# Patient Record
Sex: MALE | Race: WHITE
[De-identification: names, ages, dates, MRNs, and addresses within clinical notes are randomized per-mention and may not be internally consistent; named-entity substitution may affect disease eponyms.]

---

## 2017-11-08 ENCOUNTER — HOSPITAL (OUTPATIENT)
Dept: OTHER | Age: 22
End: 2017-11-08
Attending: EMERGENCY MEDICINE

## 2020-09-09 ENCOUNTER — HOSPITAL (OUTPATIENT)
Dept: OTHER | Age: 25
End: 2020-09-09
Attending: FAMILY MEDICINE

## 2020-09-09 ENCOUNTER — HOSPITAL (OUTPATIENT)
Dept: OTHER | Age: 25
End: 2020-09-09
Attending: NURSE PRACTITIONER

## 2020-09-09 LAB
ABS LYMPH: 1.4 K/CUMM (ref 1–3.5)
ABS LYMPH: 1.4 K/CUMM (ref 1–3.5)
ABS MONO: 1.2 K/CUMM (ref 0.1–0.8)
ABS MONO: 1.2 K/CUMM (ref 0.1–0.8)
ABS NEUTRO: 12.2 K/CUMM (ref 2–8)
ABS NEUTRO: 12.2 K/CUMM (ref 2–8)
ANION GAP SERPL CALC-SCNC: 14 MEQ/L (ref 10–20)
ANION GAP SERPL CALC-SCNC: 14 MEQ/L (ref 10–20)
BASOPHIL: 0 % (ref 0–1)
BASOPHIL: 0 % (ref 0–1)
BUN SERPL-MCNC: 9 MG/DL (ref 6–20)
BUN SERPL-MCNC: 9 MG/DL (ref 6–20)
CALCIUM: 9.4 MG/DL (ref 8.4–10.2)
CALCIUM: 9.4 MG/DL (ref 8.4–10.2)
CHLORIDE: 105 MEQ/L (ref 97–107)
CHLORIDE: 105 MEQ/L (ref 97–107)
CREATININE: 1 MG/DL (ref 0.6–1.3)
CREATININE: 1 MG/DL (ref 0.6–1.3)
DIFF_TYPE?: ABNORMAL
EOSINOPHIL: 0 % (ref 0–6)
EOSINOPHIL: 0 % (ref 0–6)
GLUCOSE LVL: 108 MG/DL (ref 70–99)
GLUCOSE LVL: 108 MG/DL (ref 70–99)
HCT VFR BLD CALC: 39 % (ref 36–51)
HCT VFR BLD CALC: 39 % (ref 36–51)
HEMOLYSIS 2+: NEGATIVE
HGB BLD-MCNC: 12.8 G/DL (ref 12–17)
HGB BLD-MCNC: 12.8 G/DL (ref 12–17)
IMMATURE GRAN: 0.3 % (ref 0–0.3)
IMMATURE GRAN: 0.3 % (ref 0–0.3)
INSTR WBC: 14.9 K/CUMM (ref 4–11)
LYMPHOCYTE: 9 %
LYMPHOCYTE: 9 %
MCH RBC QN AUTO: 28 PG (ref 25–35)
MCH RBC QN AUTO: 28 PG (ref 25–35)
MCHC RBC AUTO-ENTMCNC: 32 G/DL (ref 32–37)
MCHC RBC AUTO-ENTMCNC: 32 G/DL (ref 32–37)
MCV RBC AUTO: 86 FL (ref 78–97)
MCV RBC AUTO: 86 FL (ref 78–97)
MONOCYTE: 8 %
MONOCYTE: 8 %
NEUTROPHIL: 82 %
NEUTROPHIL: 82 %
NRBC BLD MANUAL-RTO: 0 % (ref 0–0.2)
NRBC BLD MANUAL-RTO: 0 % (ref 0–0.2)
PLATELET: 227 K/CUMM (ref 150–450)
PLATELET: 227 K/CUMM (ref 150–450)
PLT ESTIMATE: ADEQUATE
PLT ESTIMATE: ADEQUATE
POTASSIUM: 3.6 MEQ/L (ref 3.5–5.1)
POTASSIUM: 3.6 MEQ/L (ref 3.5–5.1)
RBC # BLD: 4.57 M/CUMM (ref 4.2–6)
RBC # BLD: 4.57 M/CUMM (ref 4.2–6)
RBC MORPH: NORMAL
RBC MORPH: NORMAL
RDW: 13.3 % (ref 11.5–14.5)
RDW: 13.3 % (ref 11.5–14.5)
SODIUM: 138 MEQ/L (ref 136–145)
SODIUM: 138 MEQ/L (ref 136–145)
TCO2: 23 MEQ/L (ref 19–29)
TCO2: 23 MEQ/L (ref 19–29)
WBC # BLD: 14.9 K/CUMM (ref 4–11)
WBC # BLD: 14.9 K/CUMM (ref 4–11)

## 2025-03-29 ENCOUNTER — HOSPITAL ENCOUNTER (OUTPATIENT)
Age: 30
Discharge: HOME | End: 2025-03-29
Payer: MEDICAID

## 2025-03-29 VITALS
HEART RATE: 62 BPM | TEMPERATURE: 98 F | OXYGEN SATURATION: 98 % | DIASTOLIC BLOOD PRESSURE: 82 MMHG | SYSTOLIC BLOOD PRESSURE: 137 MMHG | RESPIRATION RATE: 16 BRPM

## 2025-03-29 DIAGNOSIS — A08.4 VIRAL GASTROENTERITIS: Primary | ICD-10-CM

## 2025-03-29 LAB
BILIRUB UR QL STRIP: NEGATIVE
CLARITY UR: CLEAR
COLOR UR: YELLOW
GLUCOSE UR STRIP-MCNC: NEGATIVE MG/DL
HGB UR QL STRIP: NEGATIVE
KETONES UR STRIP-MCNC: NEGATIVE MG/DL
LEUKOCYTE ESTERASE UR QL STRIP: NEGATIVE
NITRITE UR QL STRIP: NEGATIVE
PH UR STRIP: 7 [PH]
PROT UR STRIP-MCNC: NEGATIVE MG/DL
SP GR UR STRIP: 1.02
UROBILINOGEN UR STRIP-ACNC: <2 MG/DL

## 2025-03-29 PROCEDURE — 81002 URINALYSIS NONAUTO W/O SCOPE: CPT | Performed by: PHYSICIAN ASSISTANT

## 2025-03-29 PROCEDURE — 99203 OFFICE O/P NEW LOW 30 MIN: CPT | Performed by: PHYSICIAN ASSISTANT

## 2025-03-29 RX ORDER — ONDANSETRON 4 MG/1
4 TABLET, ORALLY DISINTEGRATING ORAL EVERY 4 HOURS PRN
Qty: 10 TABLET | Refills: 0 | Status: SHIPPED | OUTPATIENT
Start: 2025-03-29 | End: 2025-04-05

## 2025-03-29 RX ORDER — QUETIAPINE FUMARATE 200 MG/1
200 TABLET, FILM COATED ORAL NIGHTLY
COMMUNITY
Start: 2025-02-12

## 2025-03-29 NOTE — ED PROVIDER NOTES
Patient Seen in: [facility]      History     Chief Complaint   Patient presents with    Nausea/Vomiting/Diarrhea     Stated Complaint: Vomiting    Subjective:   The history is provided by the patient.         30-year-old male with no significant past medical history presents to the immediate care due to nausea vomiting diarrhea starting yesterday.  Patient had multiple bouts of nonbloody emesis; none today.  Tolerating p.o. intake today but continues to feel nauseous.  Multiple bouts of nonbloody nontarry diarrhea yesterday as well but once again none today.  Abdominal pain yesterday prior to vomiting however this also has dissipated. believes It was cramping from the vomit denies any fevers URI type symptoms.  His wife had similar symptoms last week and diagnosed with norovirus.  No medications attempted at home.  No previous abdominal surgeries    Objective:     History reviewed. No pertinent past medical history.           History reviewed. No pertinent surgical history.             No pertinent social history.            Review of Systems   Constitutional: Negative.    HENT: Negative.     Respiratory: Negative.     Cardiovascular: Negative.    Gastrointestinal:  Positive for diarrhea, nausea and vomiting. Negative for abdominal pain.   Genitourinary: Negative.        Positive for stated complaint: Vomiting  Other systems are as noted in HPI.  Constitutional and vital signs reviewed.      All other systems reviewed and negative except as noted above.    Physical Exam     ED Triage Vitals [03/29/25 1519]   /82   Pulse 62   Resp 16   Temp 98.1 °F (36.7 °C)   Temp src Oral   SpO2 98 %   O2 Device None (Room air)       Current Vitals:   Vital Signs  BP: 137/82  Pulse: 62  Resp: 16  Temp: 98.1 °F (36.7 °C)  Temp src: Oral    Oxygen Therapy  SpO2: 98 %  O2 Device: None (Room air)        Physical Exam  Vitals and nursing note reviewed.   Constitutional:       General: He is not in acute distress.      Appearance: He is well-developed. He is not toxic-appearing.   HENT:      Head: Normocephalic.   Cardiovascular:      Rate and Rhythm: Normal rate and regular rhythm.   Pulmonary:      Effort: Pulmonary effort is normal.      Breath sounds: Normal breath sounds.   Abdominal:      General: Abdomen is flat. Bowel sounds are normal.      Palpations: Abdomen is soft.      Tenderness: There is no abdominal tenderness.   Skin:     General: Skin is warm.   Neurological:      General: No focal deficit present.      Mental Status: He is alert and oriented to person, place, and time.             ED Course     Labs Reviewed   [facility] POCT URINALYSIS DIPSTICK                   MDM   Ddx -viral gastroenteritis, pancreatitis, cholecystitis, appendicitis, colitis, diverticulitis    On exam the patient is afebrile nontoxic.  Vital signs are stable.  He is in no acute distress.  Abdomen is soft and nontender.  His normal bowel sounds.  UA shows no signs of infection nor dehydration.  Already tolerating p.o. intake but  continues to feel  nauseous.  Will prescribe Zofran.  Discussed continuance of pushing fluids bland food.  At this time I do not believe advanced imaging is necessary.  Exam is consistent with a viral gastroenteritis especially with his recent sick contact strict ER precautions were discussed all questions were answered and patient comfortable Treatment discharge home      Medical Decision Making  Problems Addressed:  Viral gastroenteritis: acute illness or injury    Amount and/or Complexity of Data Reviewed  Labs: ordered. Decision-making details documented in ED Course.        Disposition and Plan     Clinical Impression:  1. Viral gastroenteritis         Disposition:  Discharge  3/29/2025  3:47 pm    Follow-up:  Shoshana Almodovar, APRN  [address] [address] [phone]                Medications Prescribed:  Discharge Medication List as of 3/29/2025  3:48 PM        START taking these medications     Details   ondansetron 4 MG Oral Tablet Dispersible Take 1 tablet (4 mg total) by mouth every 4 (four) hours as needed for Nausea., Normal, Disp-10 tablet, R-0                 Supplementary Documentation:

## 2025-03-29 NOTE — DISCHARGE INSTRUCTIONS
Increase fluids and rest  Bananas rice applesauce toast  Can use Zofran as needed for nausea or vomiting  Report to the emergency room symptoms worsen abdominal pain fevers

## 2025-03-29 NOTE — ED INITIAL ASSESSMENT (HPI)
Pt sts n/v/d for the past 2 days. Today continues with nausea and feeling weak. Denies cough/cold symptoms.    Initial (On Arrival)

## 2025-04-27 ENCOUNTER — HOSPITAL ENCOUNTER (OUTPATIENT)
Age: 30
Discharge: HOME | End: 2025-04-27
Payer: MEDICAID

## 2025-04-27 ENCOUNTER — APPOINTMENT (OUTPATIENT)
Dept: GENERAL RADIOLOGY | Age: 30
End: 2025-04-27
Attending: PHYSICIAN ASSISTANT
Payer: MEDICAID

## 2025-04-27 VITALS
WEIGHT: 200 LBS | TEMPERATURE: 98 F | RESPIRATION RATE: 20 BRPM | DIASTOLIC BLOOD PRESSURE: 68 MMHG | HEART RATE: 53 BPM | OXYGEN SATURATION: 99 % | SYSTOLIC BLOOD PRESSURE: 167 MMHG

## 2025-04-27 DIAGNOSIS — S49.91XA INJURY OF RIGHT SHOULDER, INITIAL ENCOUNTER: Primary | ICD-10-CM

## 2025-04-27 PROCEDURE — 99213 OFFICE O/P EST LOW 20 MIN: CPT | Performed by: PHYSICIAN ASSISTANT

## 2025-04-27 PROCEDURE — 96372 THER/PROPH/DIAG INJ SC/IM: CPT | Performed by: PHYSICIAN ASSISTANT

## 2025-04-27 PROCEDURE — A4565 SLINGS: HCPCS | Performed by: PHYSICIAN ASSISTANT

## 2025-04-27 PROCEDURE — 73030 X-RAY EXAM OF SHOULDER: CPT | Performed by: PHYSICIAN ASSISTANT

## 2025-04-27 RX ORDER — KETOROLAC TROMETHAMINE 30 MG/ML
30 INJECTION, SOLUTION INTRAMUSCULAR; INTRAVENOUS ONCE
Status: COMPLETED | OUTPATIENT
Start: 2025-04-27 | End: 2025-04-27

## 2025-04-27 RX ORDER — HYDROCODONE BITARTRATE AND ACETAMINOPHEN 5; 325 MG/1; MG/1
1 TABLET ORAL EVERY 6 HOURS PRN
Qty: 10 TABLET | Refills: 0 | Status: SHIPPED | OUTPATIENT
Start: 2025-04-27

## 2025-04-27 NOTE — ED PROVIDER NOTES
Patient Seen in: [facility]      History     Chief Complaint   Patient presents with    Shoulder Pain     Stated Complaint: R Shoulder Injury: Pain    Subjective:   HPI    Patient is a 30-year-old right-hand-dominant male is present emergency room with an injury to his right shoulder.  Patient states that on Friday he was lifting doing bench press and shoulder exercises started noticing pain.  Since then he has had increased pain with the right shoulder.  He points over the anterior aspect and lateral border.  Worsen with movement.  No radiating pain down the arm.  No distal numbness or tingling.  The patient has been taking Tylenol and some ibuprofen.  He took a Norco that was leftover from his girlfriend.  No other reported complaints.  No chest pain or shortness of breath.  He states that it sometimes radiates from the shoulder up to his neck but he has no neck pain  History of Present Illness               Objective:     History reviewed. No pertinent past medical history.         Denies  History reviewed. No pertinent surgical history.         Denies    Social History     Socioeconomic History    Marital status: Single     Social Drivers of Health     Food Insecurity: Not on File (9/26/2024)    Received from [facility]    Food Insecurity     Food: 0   Transportation Needs: Not on File (10/7/2022)    Received from [facility]    Transportation Needs     Transportation: 0    [facility] from [facility]    Housing Stability              Review of Systems   Musculoskeletal:         Right shoulder       Positive for stated complaint: R Shoulder Injury: Pain  Other systems are as noted in HPI.  Constitutional and vital signs reviewed.      All other systems reviewed and negative except as noted above.                  Physical Exam     ED Triage Vitals [04/27/25 0826]   BP (!) 167/68   Pulse 53   Resp 20   Temp 97.8 °F (36.6 °C)   Temp src Oral   SpO2 99 %   O2 Device None (Room air)       Current  Vitals:   Vital Signs  BP: (!) 167/68  Pulse: 53  Resp: 20  Temp: 97.8 °F (36.6 °C)  Temp src: Oral    Oxygen Therapy  SpO2: 99 %  O2 Device: None (Room air)        Physical Exam  Vitals and nursing note reviewed.   Constitutional:       Appearance: Normal appearance. He is normal weight.   HENT:      Head: Normocephalic and atraumatic.   Cardiovascular:      Rate and Rhythm: Normal rate and regular rhythm.      Pulses: Normal pulses.      Heart sounds: Normal heart sounds.   Pulmonary:      Effort: Pulmonary effort is normal.      Breath sounds: Normal breath sounds.   Musculoskeletal:      Cervical back: Normal range of motion and neck supple.      Comments: Examination of the right shoulder girdle fully exposed as well as comparison to the left.  No tenderness to palpation over the clavicle.  There are some mild tenderness over the AC joint of the right shoulder.  There is definite tenderness in the anterior border of the right shoulder along the biceps tendon.  But there is no palpable defect.  There is tenderness along the lateral border of the shoulder girdle itself.  No bony tenderness along the acromion, scapula.  No swelling no redness.  The soft tissue examination of the right upper arm shows that there is no tenderness in the biceps or triceps region.  No humerus, elbow, forearm wrist or hand bony tenderness.  Patient has excellent range of motion of his fingers or wrist on the right side symmetric to the left with the equal symmetric handgrips motor strength 5/5 and symmetric.  Patient flex and extend at the elbow well.  Patient does have some range of motion actively with the right shoulder but it does cause some pain particular when he does extension and abduction.  On passive range of motion.  The patient has better movement but there is still also some pain with flexion, abduction and external rotation of the shoulder.   Skin:     General: Skin is warm and dry.      Capillary Refill: Capillary refill  takes less than 2 seconds.   Neurological:      General: No focal deficit present.      Mental Status: He is alert and oriented to person, place, and time.      Cranial Nerves: No cranial nerve deficit.      Sensory: No sensory deficit.      Motor: No weakness.      Coordination: Coordination normal.           Physical Exam                ED Course   Labs Reviewed - No data to display    ED Course as of 04/27/25 1100  ------------------------------------------------------------  Time: 04/27 0940  Comment: Discussed above findings with patient.  Went over the radiograph studies of the.  Will place patient in sling.  Him follow-up with orthopedic surgery.  Encouraged him not to use his right arm for any lifting, pushing or pulling until fully cleared by physician     Results                 XR SHOULDER, COMPLETE (MIN 2 VIEWS), RIGHT (CPT=73030)  Result Date: 4/27/2025  PROCEDURE:  XR SHOULDER, COMPLETE (MIN 2 VIEWS), RIGHT (CPT=73030)  TECHNIQUE:  Multiple views were obtained.  COMPARISON:  None.  INDICATIONS:  R Shoulder Injury: Pain  PATIENT STATED HISTORY: (As transcribed by Technologist)  Patient with right shoulder pain, limited range of motion for 2 days since lifting weights. Pain anterior then radiates superior and posterior.              CONCLUSION:  No acute fracture or dislocation involving the right shoulder.   LOCATION:  [facility]   Dictated by (CST): Suad Alonzo MD on 4/27/2025 at 9:24 AM     Finalized by (CST): Suad Alonzo MD on 4/27/2025 at 9:24 AM                       MDM      Pertinent Labs & Imaging studies reviewed. (See chart for details)  Differential diagnosis considered but not limited to:   Patient coming in with physician immediate care center for above complaint above findings noted.  Patient will be discharged home.  Close follow-up with orthopedic surgery.  Patient injured his shoulder while working out.  Rest, anti-inflammatories, Norco for breakthrough pain..  Sling for support    .  Patient is comfortable with this plan.  Overall Pt looks good. Non-toxic, well-hydrated and in no respiratory distress. Vital signs are reassuring. Exam is reassuring. I do not believe pt  requires and additional  diagnostic studiesor intervention. I believe pt  can be discharged home to continue evaluation as an outpatient. Follow-up provider given. Discharge instructions given and reviewed. Return for any problems. All understand and agreewith the plan.    Please note that this report has been produced using speech recognition software and may contain errors related to that system including, but not limited to, errors in grammar, punctuation, and spelling, as well as words and phrases that possibly may have been recognized inappropriately.  If there are any questions or concerns, contact the dictating provider for clarification.         Medical Decision Making  Problems Addressed:  Injury of right shoulder, initial encounter: undiagnosed new problem with uncertain prognosis    Amount and/or Complexity of Data Reviewed  Radiology: ordered. Decision-making details documented in ED Course.  ECG/medicine tests: ordered. Decision-making details documented in ED Course.        Disposition and Plan     Clinical Impression:  1. Injury of right shoulder, initial encounter         Disposition:  Discharge  4/27/2025  9:44 am    Follow-up:  Carolyn Newberry PA  [address] [place] [id number]  [phone]    Call in 1 day            Medications Prescribed:  Discharge Medication List as of 4/27/2025  9:48 AM        START taking these medications    Details   HYDROcodone-acetaminophen 5-325 MG Oral Tab Take 1 tablet by mouth every 6 (six) hours as needed for Pain., Normal, Disp-10 tablet, R-0             Supplementary Documentation:

## 2025-04-27 NOTE — DISCHARGE INSTRUCTIONS
Rest your shoulder is much as possible.  Do not perform any heavy lifting, pushing or pulling with the right arm until cleared by doctor.  Recommend that you call the orthopedic physician/PA that provided you with.  You may need further testing done as an outpatient such as an MRI.    Take Tylenol or Motrin for pain.  For breakthrough pain take Norco.  Norco is a narcotic.  Do not drive, work or operate heavy machinery while taking this.  Norco will also be in a narcotic has increased risk for addiction and/or constipation    Return to immediate care center or ER for new or worsening symptoms.

## 2025-04-27 NOTE — ED INITIAL ASSESSMENT (HPI)
Patient c/o right shoulder pain that radiates into the right side of his neck. Pain started after lifting weights on 4/25/25. Is having difficulty raising his right arm.

## 2025-05-03 ENCOUNTER — HOSPITAL ENCOUNTER (OUTPATIENT)
Age: 30
Discharge: HOME | End: 2025-05-03
Payer: COMMERCIAL

## 2025-05-05 ENCOUNTER — HOSPITAL ENCOUNTER (OUTPATIENT)
Age: 30
Discharge: HOME | End: 2025-05-05
Payer: COMMERCIAL

## 2025-05-05 VITALS
WEIGHT: 200 LBS | RESPIRATION RATE: 18 BRPM | OXYGEN SATURATION: 98 % | DIASTOLIC BLOOD PRESSURE: 73 MMHG | TEMPERATURE: 98 F | HEART RATE: 65 BPM | SYSTOLIC BLOOD PRESSURE: 155 MMHG

## 2025-05-05 DIAGNOSIS — Z76.89 RETURN TO WORK EVALUATION: Primary | ICD-10-CM

## 2025-05-05 PROCEDURE — 99202 OFFICE O/P NEW SF 15 MIN: CPT | Performed by: NURSE PRACTITIONER

## 2025-05-05 NOTE — ED INITIAL ASSESSMENT (HPI)
Pt presents with request for work note to return to work without restrictions. Pt was seen for R  shoulder injury on 04/27/2025. Used sling as recommended but has not had follow up with ortho due to insurance change.     Pt denies any shoulder pain at this time and no longer using sling, would like to return to work

## 2025-05-05 NOTE — ED PROVIDER NOTES
Patient Seen in: [facility]      History     Chief Complaint   Patient presents with    Note For Work     Stated Complaint: R Shoulder Issue: Needs recheck/not    Subjective:   30-year-old male presents to the immediate care for return to work note.  Patient states he was seen just over a week ago for a right shoulder injury.  He has been using the sling and resting the shoulder for the last 1 week.  He states the shoulder is feeling better and he has no pain for the last few days.  He is requesting a note so he can return to work.    The history is provided by the patient.       History of Present Illness               Objective:     History reviewed. No pertinent past medical history.           History reviewed. No pertinent surgical history.             No pertinent social history.            Review of Systems   Musculoskeletal: Negative.    All other systems reviewed and are negative.      Positive for stated complaint: R Shoulder Issue: Needs recheck/not  Other systems are as noted in HPI.  Constitutional and vital signs reviewed.      All other systems reviewed and negative except as noted above.                  Physical Exam     ED Triage Vitals [05/05/25 1419]   /73   Pulse 65   Resp 18   Temp 98.1 °F (36.7 °C)   Temp src Oral   SpO2 98 %   O2 Device None (Room air)       Current Vitals:   Vital Signs  BP: 155/73  Pulse: 65  Resp: 18  Temp: 98.1 °F (36.7 °C)  Temp src: Oral    Oxygen Therapy  SpO2: 98 %  O2 Device: None (Room air)        Physical Exam  Vitals and nursing note reviewed.   Constitutional:       General: He is not in acute distress.     Appearance: Normal appearance. He is normal weight. He is not ill-appearing.   HENT:      Head: Normocephalic and atraumatic.      Mouth/Throat:      Mouth: Mucous membranes are moist.      Pharynx: Oropharynx is clear.   Eyes:      Conjunctiva/sclera: Conjunctivae normal.   Cardiovascular:      Rate and Rhythm: Normal rate and regular  rhythm.      Pulses: Normal pulses.      Heart sounds: Normal heart sounds.   Pulmonary:      Effort: Pulmonary effort is normal. No respiratory distress.      Breath sounds: Normal breath sounds.   Musculoskeletal:         General: Normal range of motion.      Comments: No right shoulder pain with palpation.  ROM, motor strength, and sensation intact.  Hand grasps are strong and equal bilaterally.  Cap refill less than 2 seconds and radial pulses 2+.   Skin:     General: Skin is warm and dry.   Neurological:      General: No focal deficit present.      Mental Status: He is alert and oriented to person, place, and time.   Psychiatric:         Mood and Affect: Mood normal.         Behavior: Behavior normal.       Physical Exam                ED Course   Labs Reviewed - No data to display       Results                    MDM        Medical Decision Making  31 yo male for return to work note.  Note given as patient's prior injury and pain have resolved.  Patient to return to work with no restrictions.  Follow up as needed.          Disposition and Plan     Clinical Impression:  1. Return to work evaluation         Disposition:  Discharge  5/5/2025  2:34 pm    Follow-up:  James Pederson MD  [address] [facility] [address]  [phone]      As needed          Medications Prescribed:  Discharge Medication List as of 5/5/2025  2:36 PM          Supplementary Documentation:

## 2025-05-22 NOTE — ED INITIAL ASSESSMENT (HPI)
Pt with co right hip pain after miss stepping getting out of [employer] work truck 2 days ago. Was seen at immediate care at time of injury, xray done and no fracture noted.

## (undated) NOTE — LETTER
Date & Time: 5/22/2025, 5:28 PM  Patient: Mike Velázquez  Encounter Provider(s):    Corbin Obando APRN       To Whom It May Concern:    Mike Velázquez was seen and treated in our department on 5/22/2025. He may return to work 5/27/2025 if symptoms have improved and is released by occupational health.    If you have any questions or concerns, please do not hesitate to call.        _____________________________  Physician/APC Signature

## (undated) NOTE — LETTER
Date & Time: 4/27/2025, 9:43 AM  Patient: Mike Velázquez  Encounter Provider(s):    Esteban Olmos PA       To Whom It May Concern:    Mike Velázquez was seen and treated in our department on 4/27/2025. He can return to work with these limitations: No lifting, pushing or pulling or use of the right arm until cleared by Dr Lexus    If you have any questions or concerns, please do not hesitate to call.        _____________________________  Physician/APC Signature

## (undated) NOTE — LETTER
Date & Time: 5/22/2025, 5:25 PM  Patient: Mike Velázquez  Encounter Provider(s):    Corbin Obando APRN       To Whom It May Concern:    Mike Velázquez was seen and treated in our department on 5/22/2025. He may return to work Tuesday, 3/27/2025 if symptoms have improved and has been released by occupational health.    If you have any questions or concerns, please do not hesitate to call.        _____________________________  Physician/APC Signature

## (undated) NOTE — LETTER
Date & Time: 5/5/2025, 2:34 PM  Patient: Mike Velázquez  Encounter Provider(s):    Prema Mccoy APRN       To Whom It May Concern:    Mike Velázquez was seen and treated in our department on 5/5/2025. He can return to work with no limitations.    If you have any questions or concerns, please do not hesitate to call.        Electronically signed by EVERETT De Guzman  _____________________________  Physician/APC Signature

## (undated) NOTE — LETTER
Date & Time: 3/29/2025, 3:46 PM  Patient: Mike Velázquez  Encounter Provider(s):    Mere Singh PA       To Whom It May Concern:    Mike Velázquez was seen and treated in our department on 3/29/2025. He should not return to work until 04/01/25 due to illness.  Please excuse from work including 03/28/25-03/29/25.    If you have any questions or concerns, please do not hesitate to call.        _____________________________  Physician/APC Signature